# Patient Record
Sex: FEMALE | Race: WHITE | NOT HISPANIC OR LATINO | URBAN - METROPOLITAN AREA
[De-identification: names, ages, dates, MRNs, and addresses within clinical notes are randomized per-mention and may not be internally consistent; named-entity substitution may affect disease eponyms.]

---

## 2017-02-27 ENCOUNTER — INPATIENT (INPATIENT)
Facility: HOSPITAL | Age: 27
LOS: 4 days | Discharge: HOME | End: 2017-03-04
Attending: INTERNAL MEDICINE | Admitting: INTERNAL MEDICINE
Payer: COMMERCIAL

## 2017-02-27 VITALS
DIASTOLIC BLOOD PRESSURE: 84 MMHG | TEMPERATURE: 99 F | OXYGEN SATURATION: 100 % | SYSTOLIC BLOOD PRESSURE: 121 MMHG | HEART RATE: 87 BPM | RESPIRATION RATE: 16 BRPM

## 2017-02-27 DIAGNOSIS — G43.909 MIGRAINE, UNSPECIFIED, NOT INTRACTABLE, WITHOUT STATUS MIGRAINOSUS: ICD-10-CM

## 2017-02-27 LAB
ALBUMIN SERPL ELPH-MCNC: 3.5 G/DL — SIGNIFICANT CHANGE UP (ref 3.3–5)
ALP SERPL-CCNC: 157 U/L — HIGH (ref 40–120)
ALT FLD-CCNC: 20 U/L — SIGNIFICANT CHANGE UP (ref 4–33)
APPEARANCE UR: CLEAR — SIGNIFICANT CHANGE UP
AST SERPL-CCNC: 18 U/L — SIGNIFICANT CHANGE UP (ref 4–32)
BASOPHILS # BLD AUTO: 0.04 K/UL — SIGNIFICANT CHANGE UP (ref 0–0.2)
BASOPHILS NFR BLD AUTO: 0.3 % — SIGNIFICANT CHANGE UP (ref 0–2)
BILIRUB SERPL-MCNC: 0.4 MG/DL — SIGNIFICANT CHANGE UP (ref 0.2–1.2)
BILIRUB UR-MCNC: NEGATIVE — SIGNIFICANT CHANGE UP
BLOOD UR QL VISUAL: NEGATIVE — SIGNIFICANT CHANGE UP
BUN SERPL-MCNC: 5 MG/DL — LOW (ref 7–23)
CALCIUM SERPL-MCNC: 9.4 MG/DL — SIGNIFICANT CHANGE UP (ref 8.4–10.5)
CHLORIDE SERPL-SCNC: 101 MMOL/L — SIGNIFICANT CHANGE UP (ref 98–107)
CO2 SERPL-SCNC: 25 MMOL/L — SIGNIFICANT CHANGE UP (ref 22–31)
COLOR SPEC: YELLOW — SIGNIFICANT CHANGE UP
CREAT SERPL-MCNC: 0.78 MG/DL — SIGNIFICANT CHANGE UP (ref 0.5–1.3)
CRP SERPL-MCNC: 62.2 MG/L — HIGH (ref 0.3–5)
EOSINOPHIL # BLD AUTO: 1.21 K/UL — HIGH (ref 0–0.5)
EOSINOPHIL NFR BLD AUTO: 9.6 % — HIGH (ref 0–6)
ERYTHROCYTE [SEDIMENTATION RATE] IN BLOOD: 96 MM/HR — HIGH (ref 4–25)
GLUCOSE SERPL-MCNC: 111 MG/DL — HIGH (ref 70–99)
GLUCOSE UR-MCNC: NEGATIVE — SIGNIFICANT CHANGE UP
HCG UR-SCNC: NEGATIVE — SIGNIFICANT CHANGE UP
HCT VFR BLD CALC: 35.7 % — SIGNIFICANT CHANGE UP (ref 34.5–45)
HGB BLD-MCNC: 11.8 G/DL — SIGNIFICANT CHANGE UP (ref 11.5–15.5)
IMM GRANULOCYTES NFR BLD AUTO: 0.2 % — SIGNIFICANT CHANGE UP (ref 0–1.5)
KETONES UR-MCNC: NEGATIVE — SIGNIFICANT CHANGE UP
LEUKOCYTE ESTERASE UR-ACNC: NEGATIVE — SIGNIFICANT CHANGE UP
LYMPHOCYTES # BLD AUTO: 25.5 % — SIGNIFICANT CHANGE UP (ref 13–44)
LYMPHOCYTES # BLD AUTO: 3.21 K/UL — SIGNIFICANT CHANGE UP (ref 1–3.3)
MCHC RBC-ENTMCNC: 31.5 PG — SIGNIFICANT CHANGE UP (ref 27–34)
MCHC RBC-ENTMCNC: 33.1 % — SIGNIFICANT CHANGE UP (ref 32–36)
MCV RBC AUTO: 95.2 FL — SIGNIFICANT CHANGE UP (ref 80–100)
MONOCYTES # BLD AUTO: 0.86 K/UL — SIGNIFICANT CHANGE UP (ref 0–0.9)
MONOCYTES NFR BLD AUTO: 6.8 % — SIGNIFICANT CHANGE UP (ref 2–14)
MUCOUS THREADS # UR AUTO: SIGNIFICANT CHANGE UP
NEUTROPHILS # BLD AUTO: 7.27 K/UL — SIGNIFICANT CHANGE UP (ref 1.8–7.4)
NEUTROPHILS NFR BLD AUTO: 57.6 % — SIGNIFICANT CHANGE UP (ref 43–77)
NITRITE UR-MCNC: NEGATIVE — SIGNIFICANT CHANGE UP
NON-SQ EPI CELLS # UR AUTO: <1 — SIGNIFICANT CHANGE UP
PH UR: 6 — SIGNIFICANT CHANGE UP (ref 4.6–8)
PLATELET # BLD AUTO: 447 K/UL — HIGH (ref 150–400)
PMV BLD: 8.9 FL — SIGNIFICANT CHANGE UP (ref 7–13)
POTASSIUM SERPL-MCNC: 3.9 MMOL/L — SIGNIFICANT CHANGE UP (ref 3.5–5.3)
POTASSIUM SERPL-SCNC: 3.9 MMOL/L — SIGNIFICANT CHANGE UP (ref 3.5–5.3)
PROT SERPL-MCNC: 8.4 G/DL — HIGH (ref 6–8.3)
PROT UR-MCNC: 20 — SIGNIFICANT CHANGE UP
RBC # BLD: 3.75 M/UL — LOW (ref 3.8–5.2)
RBC # FLD: 13.1 % — SIGNIFICANT CHANGE UP (ref 10.3–14.5)
RBC CASTS # UR COMP ASSIST: SIGNIFICANT CHANGE UP (ref 0–?)
SODIUM SERPL-SCNC: 139 MMOL/L — SIGNIFICANT CHANGE UP (ref 135–145)
SP GR SPEC: 1.02 — SIGNIFICANT CHANGE UP (ref 1–1.03)
SP GR UR: 1.02 — SIGNIFICANT CHANGE UP (ref 1–1.03)
SQUAMOUS # UR AUTO: SIGNIFICANT CHANGE UP
UROBILINOGEN FLD QL: NORMAL E.U. — SIGNIFICANT CHANGE UP (ref 0.1–0.2)
WBC # BLD: 12.61 K/UL — HIGH (ref 3.8–10.5)
WBC # FLD AUTO: 12.61 K/UL — HIGH (ref 3.8–10.5)
WBC UR QL: SIGNIFICANT CHANGE UP (ref 0–?)

## 2017-02-27 PROCEDURE — 99223 1ST HOSP IP/OBS HIGH 75: CPT

## 2017-02-27 RX ORDER — HYDROCORTISONE 20 MG
100 TABLET ORAL ONCE
Qty: 0 | Refills: 0 | Status: COMPLETED | OUTPATIENT
Start: 2017-02-27 | End: 2017-02-27

## 2017-02-27 RX ORDER — SODIUM CHLORIDE 9 MG/ML
1000 INJECTION, SOLUTION INTRAVENOUS
Qty: 0 | Refills: 0 | Status: DISCONTINUED | OUTPATIENT
Start: 2017-02-27 | End: 2017-03-01

## 2017-02-27 RX ORDER — SODIUM CHLORIDE 9 MG/ML
1000 INJECTION INTRAMUSCULAR; INTRAVENOUS; SUBCUTANEOUS ONCE
Qty: 0 | Refills: 0 | Status: COMPLETED | OUTPATIENT
Start: 2017-02-27 | End: 2017-02-28

## 2017-02-27 RX ORDER — SODIUM CHLORIDE 9 MG/ML
1000 INJECTION INTRAMUSCULAR; INTRAVENOUS; SUBCUTANEOUS ONCE
Qty: 0 | Refills: 0 | Status: COMPLETED | OUTPATIENT
Start: 2017-02-27 | End: 2017-02-27

## 2017-02-27 RX ORDER — MULTIVIT-MIN/FERROUS GLUCONATE 9 MG/15 ML
1 LIQUID (ML) ORAL
Qty: 0 | Refills: 0 | COMMUNITY

## 2017-02-27 RX ORDER — NORGESTIMATE AND ETHINYL ESTRADIOL 7DAYSX3 LO
1 KIT ORAL
Qty: 0 | Refills: 0 | COMMUNITY

## 2017-02-27 RX ORDER — METOCLOPRAMIDE HCL 10 MG
10 TABLET ORAL ONCE
Qty: 0 | Refills: 0 | Status: COMPLETED | OUTPATIENT
Start: 2017-02-27 | End: 2017-02-27

## 2017-02-27 RX ORDER — HYDROCORTISONE 20 MG
100 TABLET ORAL ONCE
Qty: 0 | Refills: 0 | Status: DISCONTINUED | OUTPATIENT
Start: 2017-02-27 | End: 2017-02-27

## 2017-02-27 RX ORDER — L.ACIDOPH/B.ANIMALIS/B.LONGUM 15B CELL
1 CAPSULE ORAL
Qty: 0 | Refills: 0 | COMMUNITY

## 2017-02-27 RX ORDER — ALBUTEROL 90 UG/1
2 AEROSOL, METERED ORAL
Qty: 0 | Refills: 0 | COMMUNITY

## 2017-02-27 RX ADMIN — Medication 10 MILLIGRAM(S): at 22:18

## 2017-02-27 RX ADMIN — SODIUM CHLORIDE 1000 MILLILITER(S): 9 INJECTION INTRAMUSCULAR; INTRAVENOUS; SUBCUTANEOUS at 22:18

## 2017-02-27 RX ADMIN — Medication 100 MILLIGRAM(S): at 22:18

## 2017-02-27 NOTE — H&P ADULT. - ASSESSMENT
26 year old female, with PH significant for Asthma, Ulcerative colitis, Abscess, Migraine headache presented to the ED secondary to headache and ulcerative colitis flare; seen and evaluated at bedside with mother present.  Diagnosed with Irritable Bowel Disease and Migraine.  Admitted for further management of: 26 year old female, with PH significant for Asthma, Ulcerative colitis, Abscess, Migraine headache presented to the ED secondary to headache and ulcerative colitis flare; seen and evaluated at bedside with mother present.  Diagnosed with Irritable Bowel Disease and Migraine.  Admitted for further management of Ulcerative colitis, Non-intractable migraine, Uncomplicated asthma, Abscess...

## 2017-02-27 NOTE — H&P ADULT. - PROBLEM SELECTOR PLAN 5
- none for now  - ambulate as tolerated  - out of bed to chair BID - vaginal - since tapering Humira  - s/p evaluation by GYN - I&D and antibiotics  - continues on Augmentin for one more day

## 2017-02-27 NOTE — H&P ADULT. - FAMILY HISTORY
Father  Still living? Unknown  Family history of diabetes mellitus, Age at diagnosis: Age Unknown     Grandparent  Still living? Unknown  Family history of lung cancer, Age at diagnosis: Age Unknown

## 2017-02-27 NOTE — H&P ADULT. - HISTORY OF PRESENT ILLNESS
26 year old female, with PH significant for Asthma, Ulcerative colitis, Abscess, Migraine headache presented to the ED secondary to headache and ulcerative colitis flare; seen and evaluated at bedside;      Vital signs upon ED presentation as follows: BP = 121/84, HR = 87, RR = 16, T = 37.1 C (98.8 F), O2 Sat = 100% on RA.  Diagnosed with Irritable Bowel Disease and Migraine.  Prescribed solu-cortef 100 mg, Metoclopramide 10 mg and Sodium chloride 1 liter in the ED. 26 year old female, with PH significant for Asthma, Ulcerative colitis, Abscess, Migraine headache presented to the ED secondary to headache and ulcerative colitis flare; seen and evaluated at bedside with mother present.  Patient relates that she has been taking Humira for some time and decided to taper same on her own.  However, since this attempt, patient has been suffering with "weird" signs/symptoms.  Patient states that she has had bumps on her legs, ulcers on her eyelid and abscess on vaginal area that required GYN visit with subsequent I&D and antibiotic therapy.  She also has associated intermittent migraine headaches - the current one being of the greatest intensity thus far; rated at >10/10 at maximum intensity.  Current migraine headache started on Friday and has been persistent.  She comments that after exiting the shower on the day of ED presentation, she noted that her left eye was significantly swollen.  Patient contacted her gastroenterologist, Tarik Graham MD, philly advised that patient should come to the ED.  Attempted to ameliorate the headache with OTC medications, including Advil, but without success.  Relative to the UC, patient is not aware of any rectal ulcers, no rectal pain, no significant abdominal pain.  Does, however, endorse diarrhea consisting of brownish stools.  Had one episode of vomiting in the ED, which patient attributes to S/E of the medication she received in the ED.  No reports of sick contacts.  No blurred vision, rhinorrhea, sinus issues, fevers, chills, chest pain or SOB.  Comments on pain sensation below B/L lower ribs areas - exacerbated with deep inspiration.    Vital signs upon ED presentation as follows: BP = 121/84, HR = 87, RR = 16, T = 37.1 C (98.8 F), O2 Sat = 100% on RA.  Diagnosed with Irritable Bowel Disease and Migraine.  Prescribed solu-cortef 100 mg, Metoclopramide 10 mg and Sodium chloride 1 liter in the ED.

## 2017-02-27 NOTE — H&P ADULT. - PROBLEM SELECTOR PLAN 3
- no acute issues presently  - continues on proventil HFA - below bilateral lower ribs & pronounced during deep inspiration  - pt w/ h/o asthma, but no wheezing, coughing, SOB, fever  - f/u CT A/P for lower lung fields  - no need for analgesic presently  - continued evaluation for any sign of improvement/worsening

## 2017-02-27 NOTE — H&P ADULT. - PROBLEM SELECTOR PROBLEM 2
Ulcerative colitis with other complication, unspecified location Nonintractable migraine, unspecified migraine type

## 2017-02-27 NOTE — H&P ADULT. - PROBLEM SELECTOR PLAN 1
- patient has been self tapering medication, but suffers with unusual signs/symptoms - bumps on skin, ulcers on eye and vagina, - patient has been self tapering Humira (has not taken dose in 4 weeks), but now suffers with unusual signs/symptoms - bumps on skin, ulcers on oral mucosa, eye and vagina, migraine headaches  - no report of abdominal pain or ulcers at rectal/anal area,   - increased frequency of stools ~ 6 daily  - ESR elevated  - s/p solu-cortef 100 mg in the ED; continued at 100 mg Q8H  - IVF hydration  - GI consult in the AM - Tarik Graham MD (please call)  - f/u CT abd/pelvis (patient already had oral contrast) - patient has been self tapering Humira (has not taken dose in 4 weeks), but now suffers with unusual signs/symptoms - bumps on skin, ulcers on oral mucosa, eye and vagina, migraine headaches  - no report of abdominal pain or ulcers at rectal/anal area,   - increased frequency of stools ~ 6 daily  - ESR elevated = 96  - s/p solu-cortef 100 mg in the ED; continued at 100 mg Q8H  - IVF hydration  - GI consult in the AM - Tarik Graham MD (please call)  - f/u CT abd/pelvis (patient already had oral contrast)

## 2017-02-27 NOTE — H&P ADULT. - PROBLEM SELECTOR PLAN 2
- patient associates same with ulcerative colitis tapering  - located left supraorbital and with swelling of eyelids (improved since ED)  - no report of sinus symptoms, vision difficulties  - on solu-cortef, which most likely has helped to ameliorate pain sensation  - IVF hydration  - analgesic if pain worsens

## 2017-02-27 NOTE — ED PROVIDER NOTE - SKIN, MLM
Skin normal color for race, warm, dry and intact. No evidence of rash. scatterd ~2inch erythematomous nodules on b/l legs; apthous ulcer

## 2017-02-27 NOTE — H&P ADULT. - MUSCULOSKELETAL
details… detailed exam no calf tenderness/no joint erythema/no joint warmth/ROM intact/no joint swelling

## 2017-02-27 NOTE — ED ADULT TRIAGE NOTE - CHIEF COMPLAINT QUOTE
Pt. c/o severe migraine since  Friday that has gotten worst 10/10 pain and swelling, redness to L eye.  Pt. has been weening herself off of humara for 1 month.

## 2017-02-27 NOTE — H&P ADULT. - PROBLEM SELECTOR PLAN 4
- vaginal - since tapering Humira  - s/p evaluation by GYN - I&D and antibiotics  - continues on Augmentin for one more day - no acute issues presently  - continues on proventil HFA

## 2017-02-27 NOTE — H&P ADULT. - PROBLEM SELECTOR PLAN 6
- clear liquid diet for now (pending GI's recommendation/s)  - IVF hydration - none for now  - ambulate as tolerated  - out of bed to chair BID

## 2017-02-27 NOTE — H&P ADULT. - PROBLEM SELECTOR PROBLEM 3
Nonintractable migraine, unspecified migraine type Uncomplicated asthma, unspecified asthma severity Rib pain

## 2017-02-27 NOTE — ED PROVIDER NOTE - OBJECTIVE STATEMENT
26F with UC p/w migraine since friday and UC flare; no fever/ 6 episodes - sometimes bloody/ sometimes brown increasing in frequency over last few days;  migraine is pressure on left eye, no trauma; similar to previous headaches but not more severe; pressure in eye area is new;  no hematuria/dysuria; currently menstruating. was on antibiotics for abscess - removed last monday, no on amoxicillin; no cp/sob/    met a few yeasr ago , father has UC, had bloody diarrhea, wnet to ER - dx with E nordosum; on humara intermittent follow up, mom called bc stopped humara a few months ago, saw eye doctor/derm; now has diarrhea, took sick to be see in office tomm; ARCADIO vinson;     dr. sheree portillo - admit ready - [phone] 26F with UC p/w migraine since friday and UC flare; no fever/ 6 episodes - sometimes bloody/ sometimes brown increasing in frequency over last few days;  migraine is pressure on left eye, no trauma; similar to previous headaches but not more severe; pressure in eye area is new;  no hematuria/dysuria; currently menstruating. was on antibiotics for abscess - removed last monday, no on amoxicillin; no cp/sob/  met a few yeasr ago , father has UC, had bloody diarrhea, wnet to ER - dx with E nordosum; on humara intermittent follow up, mom called bc stopped humara a few months ago, saw eye doctor/derm; now has diarrhea, took sick to be see in office tomm; ARCADIO vinson;     dr. sheree portillo - admit ready - [phone]

## 2017-02-27 NOTE — ED PROVIDER NOTE - ATTENDING CONTRIBUTION TO CARE
DR. MATT, ATTENDING MD-  I performed a face to face bedside interview with patient regarding history of present illness, review of symptoms and past medical history. I completed an independent physical exam.  I have discussed patient's plan of care with the resident.   Documentation as above in the note.    25 y/o female with h/o ulc colitis here with mult med complaints.  Headache worse behind left eye, intra-oral ulcer, rash over legs, crampy diffuse abd pain, diarrhea x6 not bloody today.  Denies f/c, neck stiffness, cp, sob, cough, dysuria.  Afebrile, vs wnl, appears uncomfortable, left eyelid mildly swollen, left eye mildly injected conjunctiva, eomi, perrla, no proptosis, ctabil, s1s2 rrr no m/r/g, abd soft non ttp no r/g, no cva tenderness b/l, no leg swelling b/l, erythema nodosum b/l LE's, single aphthous ulcer at left buccal mucosa.  Likely ulc colitis flare with concomitant migraine, no signs of orbital cellulitis or bowel obstruction, vss.  GI doctor requests for pt to receive CT abd/pelv to better diff IBS.  Obtain cbc, bmp, give ivf, steroid, antiemetic, admit.

## 2017-02-27 NOTE — ED PROVIDER NOTE - MEDICAL DECISION MAKING DETAILS
diarrhea, apthous ulcer, e nordosum like rash, preseptal edema, findings c/s ibd flare (us vs. chrons), + migraine like headache and mild r eye presetal edema, normal visual acuity, no overlying warmth, erythema, pain with extra-occular motion; will give steroids, will get ct scan as pt has not had one in past and potential to r/o complications and perhaps better characterize as uc vs chrons, admit, re-assess

## 2017-02-27 NOTE — H&P ADULT. - SKIN COMMENTS
tattoo on right lateral foot tattoo on right lateral foot, erythematous lump under skin at left posterior lower leg, right shin and left upper lateral thigh

## 2017-02-27 NOTE — ED PROVIDER NOTE - PHYSICAL EXAMINATION
mild preseptal edema, no erythema, warmth, no pain with extra-ocular movements, 20/20 visual acuity b/l;

## 2017-02-28 DIAGNOSIS — J45.909 UNSPECIFIED ASTHMA, UNCOMPLICATED: ICD-10-CM

## 2017-02-28 DIAGNOSIS — R63.8 OTHER SYMPTOMS AND SIGNS CONCERNING FOOD AND FLUID INTAKE: ICD-10-CM

## 2017-02-28 DIAGNOSIS — L02.91 CUTANEOUS ABSCESS, UNSPECIFIED: ICD-10-CM

## 2017-02-28 DIAGNOSIS — G43.009 MIGRAINE WITHOUT AURA, NOT INTRACTABLE, WITHOUT STATUS MIGRAINOSUS: ICD-10-CM

## 2017-02-28 DIAGNOSIS — Z41.8 ENCOUNTER FOR OTHER PROCEDURES FOR PURPOSES OTHER THAN REMEDYING HEALTH STATE: ICD-10-CM

## 2017-02-28 DIAGNOSIS — K51.918 ULCERATIVE COLITIS, UNSPECIFIED WITH OTHER COMPLICATION: ICD-10-CM

## 2017-02-28 DIAGNOSIS — K51.90 ULCERATIVE COLITIS, UNSPECIFIED, WITHOUT COMPLICATIONS: ICD-10-CM

## 2017-02-28 DIAGNOSIS — R07.81 PLEURODYNIA: ICD-10-CM

## 2017-02-28 PROCEDURE — 74177 CT ABD & PELVIS W/CONTRAST: CPT | Mod: 26

## 2017-02-28 PROCEDURE — 99254 IP/OBS CNSLTJ NEW/EST MOD 60: CPT

## 2017-02-28 RX ORDER — ALBUTEROL 90 UG/1
2 AEROSOL, METERED ORAL EVERY 6 HOURS
Qty: 0 | Refills: 0 | Status: DISCONTINUED | OUTPATIENT
Start: 2017-02-28 | End: 2017-03-04

## 2017-02-28 RX ORDER — HYDROCORTISONE 20 MG
100 TABLET ORAL EVERY 8 HOURS
Qty: 0 | Refills: 0 | Status: DISCONTINUED | OUTPATIENT
Start: 2017-02-28 | End: 2017-03-04

## 2017-02-28 RX ORDER — ONDANSETRON 8 MG/1
4 TABLET, FILM COATED ORAL EVERY 8 HOURS
Qty: 0 | Refills: 0 | Status: DISCONTINUED | OUTPATIENT
Start: 2017-02-28 | End: 2017-03-04

## 2017-02-28 RX ORDER — ACETAMINOPHEN 500 MG
650 TABLET ORAL EVERY 6 HOURS
Qty: 0 | Refills: 0 | Status: DISCONTINUED | OUTPATIENT
Start: 2017-02-28 | End: 2017-03-04

## 2017-02-28 RX ADMIN — Medication 100 MILLIGRAM(S): at 22:26

## 2017-02-28 RX ADMIN — SODIUM CHLORIDE 100 MILLILITER(S): 9 INJECTION, SOLUTION INTRAVENOUS at 01:37

## 2017-02-28 RX ADMIN — Medication 100 MILLIGRAM(S): at 05:45

## 2017-02-28 RX ADMIN — SODIUM CHLORIDE 1000 MILLILITER(S): 9 INJECTION INTRAMUSCULAR; INTRAVENOUS; SUBCUTANEOUS at 00:23

## 2017-02-28 RX ADMIN — Medication 1 TABLET(S): at 05:44

## 2017-02-28 RX ADMIN — Medication 1 TABLET(S): at 17:48

## 2017-02-28 RX ADMIN — Medication 100 MILLIGRAM(S): at 14:09

## 2017-03-01 LAB
BUN SERPL-MCNC: 2 MG/DL — LOW (ref 7–23)
CALCIUM SERPL-MCNC: 8.1 MG/DL — LOW (ref 8.4–10.5)
CHLORIDE SERPL-SCNC: 107 MMOL/L — SIGNIFICANT CHANGE UP (ref 98–107)
CO2 SERPL-SCNC: 21 MMOL/L — LOW (ref 22–31)
CREAT SERPL-MCNC: 0.64 MG/DL — SIGNIFICANT CHANGE UP (ref 0.5–1.3)
GLUCOSE SERPL-MCNC: 143 MG/DL — HIGH (ref 70–99)
HCT VFR BLD CALC: 33.1 % — LOW (ref 34.5–45)
HGB BLD-MCNC: 11 G/DL — LOW (ref 11.5–15.5)
MCHC RBC-ENTMCNC: 31.3 PG — SIGNIFICANT CHANGE UP (ref 27–34)
MCHC RBC-ENTMCNC: 33.2 % — SIGNIFICANT CHANGE UP (ref 32–36)
MCV RBC AUTO: 94.3 FL — SIGNIFICANT CHANGE UP (ref 80–100)
PLATELET # BLD AUTO: 414 K/UL — HIGH (ref 150–400)
PMV BLD: 9.2 FL — SIGNIFICANT CHANGE UP (ref 7–13)
POTASSIUM SERPL-MCNC: 3.4 MMOL/L — LOW (ref 3.5–5.3)
POTASSIUM SERPL-SCNC: 3.4 MMOL/L — LOW (ref 3.5–5.3)
RBC # BLD: 3.51 M/UL — LOW (ref 3.8–5.2)
RBC # FLD: 13.3 % — SIGNIFICANT CHANGE UP (ref 10.3–14.5)
SODIUM SERPL-SCNC: 143 MMOL/L — SIGNIFICANT CHANGE UP (ref 135–145)
WBC # BLD: 9.27 K/UL — SIGNIFICANT CHANGE UP (ref 3.8–10.5)
WBC # FLD AUTO: 9.27 K/UL — SIGNIFICANT CHANGE UP (ref 3.8–10.5)

## 2017-03-01 PROCEDURE — 99232 SBSQ HOSP IP/OBS MODERATE 35: CPT

## 2017-03-01 RX ORDER — POTASSIUM CHLORIDE 20 MEQ
40 PACKET (EA) ORAL ONCE
Qty: 0 | Refills: 0 | Status: COMPLETED | OUTPATIENT
Start: 2017-03-01 | End: 2017-03-01

## 2017-03-01 RX ADMIN — Medication 100 MILLIGRAM(S): at 21:32

## 2017-03-01 RX ADMIN — Medication 1 TABLET(S): at 17:32

## 2017-03-01 RX ADMIN — Medication 100 MILLIGRAM(S): at 13:00

## 2017-03-01 RX ADMIN — Medication 100 MILLIGRAM(S): at 06:39

## 2017-03-02 LAB
ALBUMIN SERPL ELPH-MCNC: 2.9 G/DL — LOW (ref 3.3–5)
ALP SERPL-CCNC: 116 U/L — SIGNIFICANT CHANGE UP (ref 40–120)
ALT FLD-CCNC: 15 U/L — SIGNIFICANT CHANGE UP (ref 4–33)
AST SERPL-CCNC: 14 U/L — SIGNIFICANT CHANGE UP (ref 4–32)
BILIRUB SERPL-MCNC: 0.5 MG/DL — SIGNIFICANT CHANGE UP (ref 0.2–1.2)
BUN SERPL-MCNC: 5 MG/DL — LOW (ref 7–23)
CALCIUM SERPL-MCNC: 8.8 MG/DL — SIGNIFICANT CHANGE UP (ref 8.4–10.5)
CHLORIDE SERPL-SCNC: 101 MMOL/L — SIGNIFICANT CHANGE UP (ref 98–107)
CO2 SERPL-SCNC: 24 MMOL/L — SIGNIFICANT CHANGE UP (ref 22–31)
CREAT SERPL-MCNC: 0.59 MG/DL — SIGNIFICANT CHANGE UP (ref 0.5–1.3)
GLUCOSE SERPL-MCNC: 109 MG/DL — HIGH (ref 70–99)
HCT VFR BLD CALC: 36.8 % — SIGNIFICANT CHANGE UP (ref 34.5–45)
HGB BLD-MCNC: 12 G/DL — SIGNIFICANT CHANGE UP (ref 11.5–15.5)
MAGNESIUM SERPL-MCNC: 1.9 MG/DL — SIGNIFICANT CHANGE UP (ref 1.6–2.6)
MCHC RBC-ENTMCNC: 31.1 PG — SIGNIFICANT CHANGE UP (ref 27–34)
MCHC RBC-ENTMCNC: 32.6 % — SIGNIFICANT CHANGE UP (ref 32–36)
MCV RBC AUTO: 95.3 FL — SIGNIFICANT CHANGE UP (ref 80–100)
PLATELET # BLD AUTO: 474 K/UL — HIGH (ref 150–400)
PMV BLD: 9.5 FL — SIGNIFICANT CHANGE UP (ref 7–13)
POTASSIUM SERPL-MCNC: 3.7 MMOL/L — SIGNIFICANT CHANGE UP (ref 3.5–5.3)
POTASSIUM SERPL-SCNC: 3.7 MMOL/L — SIGNIFICANT CHANGE UP (ref 3.5–5.3)
PROT SERPL-MCNC: 7.1 G/DL — SIGNIFICANT CHANGE UP (ref 6–8.3)
RBC # BLD: 3.86 M/UL — SIGNIFICANT CHANGE UP (ref 3.8–5.2)
RBC # FLD: 13.5 % — SIGNIFICANT CHANGE UP (ref 10.3–14.5)
SODIUM SERPL-SCNC: 141 MMOL/L — SIGNIFICANT CHANGE UP (ref 135–145)
WBC # BLD: 10.6 K/UL — HIGH (ref 3.8–10.5)
WBC # FLD AUTO: 10.6 K/UL — HIGH (ref 3.8–10.5)

## 2017-03-02 RX ORDER — POTASSIUM CHLORIDE 20 MEQ
40 PACKET (EA) ORAL ONCE
Qty: 0 | Refills: 0 | Status: COMPLETED | OUTPATIENT
Start: 2017-03-02 | End: 2017-03-02

## 2017-03-02 RX ADMIN — Medication 100 MILLIGRAM(S): at 23:02

## 2017-03-02 RX ADMIN — Medication 100 MILLIGRAM(S): at 05:39

## 2017-03-02 RX ADMIN — Medication 1 TABLET(S): at 11:45

## 2017-03-02 RX ADMIN — Medication 100 MILLIGRAM(S): at 15:12

## 2017-03-02 NOTE — PROVIDER CONTACT NOTE (MEDICATION) - ACTION/TREATMENT ORDERED:
required dose not available in tablet form, potassium chloride solution to be ordered. Pt is aware and willing to try

## 2017-03-02 NOTE — PROVIDER CONTACT NOTE (MEDICATION) - ASSESSMENT
Lab results show recent serum K+ level of 3.4, was 3.9 on 2/27/17. Pt denies chest pain-no evidence of acute distress, VSS.

## 2017-03-02 NOTE — PROVIDER CONTACT NOTE (MEDICATION) - SITUATION
As per report from day RN, Anastasia patient refused potassium chloride powder and is requesting tablets.

## 2017-03-03 LAB — C DIFF TOX GENS STL QL NAA+PROBE: SIGNIFICANT CHANGE UP

## 2017-03-03 RX ADMIN — Medication 100 MILLIGRAM(S): at 13:53

## 2017-03-03 RX ADMIN — Medication 1 TABLET(S): at 12:15

## 2017-03-03 RX ADMIN — Medication 100 MILLIGRAM(S): at 06:32

## 2017-03-03 RX ADMIN — Medication 100 MILLIGRAM(S): at 21:46

## 2017-03-04 VITALS
HEART RATE: 80 BPM | OXYGEN SATURATION: 96 % | SYSTOLIC BLOOD PRESSURE: 113 MMHG | TEMPERATURE: 97 F | DIASTOLIC BLOOD PRESSURE: 76 MMHG | RESPIRATION RATE: 18 BRPM

## 2017-03-04 RX ORDER — ACETAMINOPHEN 500 MG
2 TABLET ORAL
Qty: 0 | Refills: 0 | COMMUNITY
Start: 2017-03-04

## 2017-03-04 RX ORDER — ADALIMUMAB 40MG/0.8ML
0 KIT SUBCUTANEOUS
Qty: 0 | Refills: 0 | COMMUNITY

## 2017-03-04 RX ADMIN — Medication 100 MILLIGRAM(S): at 13:14

## 2017-03-04 RX ADMIN — Medication 1 TABLET(S): at 13:15

## 2017-03-04 RX ADMIN — Medication 100 MILLIGRAM(S): at 05:46

## 2017-03-04 NOTE — DISCHARGE NOTE ADULT - MEDICATION SUMMARY - MEDICATIONS TO TAKE
I will START or STAY ON the medications listed below when I get home from the hospital:    predniSONE 10 mg oral tablet  -- 4 tab(s) by mouth once a day  -- It is very important that you take or use this exactly as directed.  Do not skip doses or discontinue unless directed by your doctor.  Obtain medical advice before taking any non-prescription drugs as some may affect the action of this medication.  Take with food or milk.    -- Indication: For Ulcerative colitis    acetaminophen 325 mg oral tablet  -- 2 tab(s) by mouth every 6 hours, As needed, Mild Pain (1 - 3)  -- Indication: For pain    ProAir HFA 90 mcg/inh inhalation aerosol  -- 2 puff(s) inhaled 4 times a day, As Needed  -- Indication: For sob/wheezing    VSL#3 oral capsule  -- 1 cap(s) by mouth once a day  -- Indication: For prophylaxis    Tri-Lo-Sprintec oral tablet  -- 1 tab(s) by mouth once a day  -- Indication: For birth control    One-A-Day Women oral tablet  -- 1 tab(s) by mouth once a day  -- Indication: For supplement

## 2017-03-04 NOTE — DISCHARGE NOTE ADULT - PLAN OF CARE
- vaginal - since tapering Humira  - s/p evaluation by GYN - I&D and antibiotics  - completed course of antibiotics   - ID consulted recs to monitor off Abx.    Pt to follow up with private GYN as an outpt resolved remain free of pain Tylenol PRN remain free of flares Please follow up with your pcp and Dr. Graham Gastroenterologist within 1 week of discharge.  Please call to make an apointment.. Continue with prednisone as prescribed

## 2017-03-04 NOTE — DISCHARGE NOTE ADULT - HOSPITAL COURSE
26 year old female, with PH significant for Asthma, Ulcerative colitis, Abscess, Migraine headache presented to the ED secondary to headache and ulcerative colitis flare; seen and evaluated at bedside with mother present.  Diagnosed with Irritable Bowel Disease and Migraine.  Admitted for further management of Ulcerative colitis, Non-intractable migraine, Uncomplicated asthma, Abscess...    ULCERATIVE COLITIS  - patient has been self tapering Humira (has not taken dose in 4 weeks)  - ESR elevated = 96  - s/p solu-cortef 100 mg in the ED; continued at 100 mg Q8H  - IVF hydration  - GI Dr Graham following   - CT abd/pelvis: Mild wall thickening of the sigmoid colon and rectum compatible with colitis.  -Discharge on Prednison 40mg daily : Pt to follow up with Dr. Graham within 1 week of discharge.      Leukocytosis  - ID consulted recs to monitor off Abx.      MIGRAINE   - located left supraorbital and with swelling of eyelids (improved since ED)  - on solu-cortef, which most likely has helped to ameliorate pain sensation  - IVF hydration, tylenol PRN     RIB PAIN   - below bilateral lower ribs & pronounced during deep inspiration  - pt w/ h/o asthma, but no wheezing, coughing, SOB, fever  - continued evaluation for any sign of improvement/worsening.  - Tylenol PRN    ASTHMA   - no acute issues presently  - continues on proventil HFA.     ABSCESS  - vaginal - since tapering Humira  - s/p evaluation by GYN - I&D and antibiotics  - continues on Augmentin for one more day.   - ID consulted recs to monitor off Abx.      DVT PPX  - none for now  - ambulate as tolerated  - out of bed to chair BID.    Dispo: Home

## 2017-03-04 NOTE — DISCHARGE NOTE ADULT - PATIENT PORTAL LINK FT
“You can access the Follow[facility] Patient Portal, offered by [facility], by registering with the following website: [email or web address]”

## 2017-03-04 NOTE — DISCHARGE NOTE ADULT - CARE PLAN
Principal Discharge DX:	Ulcerative colitis  Goal:	remain free of flares  Instructions for follow-up, activity and diet:	Please follow up with your pcp and Dr. Graham Gastroenterologist within 1 week of discharge.  Please call to make an apointment.. Continue with prednisone as prescribed  Secondary Diagnosis:	Migraine  Goal:	remain free of pain  Instructions for follow-up, activity and diet:	Tylenol PRN  Secondary Diagnosis:	Leukocytosis  Goal:	resolved  Secondary Diagnosis:	Abscess  Goal:	resolved  Instructions for follow-up, activity and diet:	- vaginal - since tapering Humira  - s/p evaluation by GYN - I&D and antibiotics  - completed course of antibiotics   - ID consulted recs to monitor off Abx.    Pt to follow up with private GYN as an outpt

## 2017-03-04 NOTE — DISCHARGE NOTE ADULT - MEDICATION SUMMARY - MEDICATIONS TO STOP TAKING
I will STOP taking the medications listed below when I get home from the hospital:    Augmentin 875 mg-125 mg oral tablet  -- 1 tab(s) by mouth every 12 hours    Humira 40 mg/0.8 mL subcutaneous kit  --  subcutaneous every 2 weeks

## 2017-03-04 NOTE — DISCHARGE NOTE ADULT - CARE PROVIDER_API CALL
Tarik Graham (DO), Gastroenterology; Internal Medicine  [address] [place] [address]  Phone: [phone]  Fax: [phone]

## 2018-05-14 NOTE — PATIENT PROFILE ADULT. - PRO MENTAL HEALTH SX RECENT
ED notes reviewed    Dr Ríos is manageing UTI/hematuria    Please inquire to Fausto becerriloojon concerns       none

## 2018-09-21 NOTE — PATIENT PROFILE ADULT. - FUNCTIONAL SCREEN CURRENT LEVEL: COMMUNICATION, MLM
Detail Level: Simple
Hide Additional Notes?: No
Additional Notes (Optional): What are he is showing me are normal glands. Should any of these grow out of proportion to the others he will let us know
(0) understands/communicates without difficulty

## 2018-12-12 NOTE — H&P ADULT. - [facility]
Pt states that she thinks that she has bacterial vaginosis. Pt wanted DONNA to call in a rx. Informed pt that she would need to come in for cultures. Pt states that she has thick white discharge and a little vaginal itching. Denies an odor.   Asked pt i regular rate and rhythm

## 2019-01-16 ENCOUNTER — TRANSCRIPTION ENCOUNTER (OUTPATIENT)
Age: 29
End: 2019-01-16

## 2019-08-23 NOTE — PATIENT PROFILE ADULT. - AS SC BRADEN MOISTURE
Appearance: Pt awake, alert & oriented to person, place & time. Pt in no acute distress at present time. Pt is clean and well groomed with clothes appropriately fastened.   Skin: Skin warm, dry & intact. Color consistent with ethnicity. Mucous membranes moist. No breakdown or brusing noted.   Musculoskeletal: Patient moving all extremities well, no obvious swelling or deformities noted.   Respiratory: Respirations spontaneous, even, and non-labored. Visible chest rise noted. Airway is open and patent. No accessory muscle use noted.   Neurologic: Sensation is intact. Speech is clear and appropriate. Eyes open spontaneously, behavior appropriate to situation, follows commands, facial expression symmetrical, bilateral hand grasp equal and even, purposeful motor response noted.  Cardiac: All peripheral pulses present. No Bilateral lower extremity edema. Cap refill is <3 seconds. Pt denies active chest pains, SOB, dizziness, blurred vision, weakness or fatigue at this time. Regular rate and rhythm noted at this time.   Abdomen:  Pt denies active abd pains, cramping or discomfort, No N/V/D at this time.   : Pt reports no dysuria or hematuria.         (4) rarely moist

## 2020-11-09 ENCOUNTER — TRANSCRIPTION ENCOUNTER (OUTPATIENT)
Age: 30
End: 2020-11-09

## 2020-11-30 ENCOUNTER — TRANSCRIPTION ENCOUNTER (OUTPATIENT)
Age: 30
End: 2020-11-30

## 2022-01-05 ENCOUNTER — TRANSCRIPTION ENCOUNTER (OUTPATIENT)
Age: 32
End: 2022-01-05

## 2022-01-05 PROBLEM — K51.90 ULCERATIVE COLITIS, UNSPECIFIED, WITHOUT COMPLICATIONS: Chronic | Status: ACTIVE | Noted: 2017-02-28

## 2022-01-05 PROBLEM — G43.909 MIGRAINE, UNSPECIFIED, NOT INTRACTABLE, WITHOUT STATUS MIGRAINOSUS: Chronic | Status: ACTIVE | Noted: 2017-02-28

## 2022-01-06 ENCOUNTER — OUTPATIENT (OUTPATIENT)
Dept: INPATIENT UNIT | Facility: HOSPITAL | Age: 32
LOS: 1 days | End: 2022-01-06
Payer: MEDICAID

## 2022-01-06 ENCOUNTER — APPOINTMENT (OUTPATIENT)
Dept: DISASTER EMERGENCY | Facility: HOSPITAL | Age: 32
End: 2022-01-06

## 2022-01-06 VITALS
HEIGHT: 60 IN | DIASTOLIC BLOOD PRESSURE: 85 MMHG | TEMPERATURE: 98 F | OXYGEN SATURATION: 97 % | SYSTOLIC BLOOD PRESSURE: 117 MMHG | HEART RATE: 94 BPM | WEIGHT: 139.99 LBS | RESPIRATION RATE: 18 BRPM

## 2022-01-06 VITALS
RESPIRATION RATE: 18 BRPM | SYSTOLIC BLOOD PRESSURE: 112 MMHG | HEART RATE: 90 BPM | DIASTOLIC BLOOD PRESSURE: 78 MMHG | OXYGEN SATURATION: 98 % | TEMPERATURE: 98 F

## 2022-01-06 DIAGNOSIS — U07.1 COVID-19: ICD-10-CM

## 2022-01-06 PROCEDURE — M0247: CPT

## 2022-01-06 RX ORDER — SOTROVIMAB 62.5 MG/ML
500 INJECTION, SOLUTION, CONCENTRATE INTRAVENOUS ONCE
Refills: 0 | Status: COMPLETED | OUTPATIENT
Start: 2022-01-06 | End: 2022-01-06

## 2022-01-06 RX ORDER — SODIUM CHLORIDE 9 MG/ML
250 INJECTION INTRAMUSCULAR; INTRAVENOUS; SUBCUTANEOUS
Refills: 0 | Status: DISCONTINUED | OUTPATIENT
Start: 2022-01-06 | End: 2022-01-20

## 2022-01-06 RX ADMIN — SODIUM CHLORIDE 25 MILLILITER(S): 9 INJECTION INTRAMUSCULAR; INTRAVENOUS; SUBCUTANEOUS at 08:36

## 2022-01-06 RX ADMIN — SOTROVIMAB 116 MILLIGRAM(S): 62.5 INJECTION, SOLUTION, CONCENTRATE INTRAVENOUS at 08:36

## 2022-01-06 NOTE — CHART NOTE - NSCHARTNOTEFT_GEN_A_CORE
CC: Monoclonal Antibody Infusion - COVID-19 Positive or significant COVID-19 exposure       History: 31 year old female with Asthma and Crohn's disease on Remicade, who is vaccinated but not boosted since she was not eligible yet  , developed symptoms.  Patient presents for infusion of Sotrovimab monoclonal antibody infusion. Patient has been screened and was deemed to be a candidate.    Date tested COVID-19 positive: 1/5/22      COVID Symptoms:  Date of onset:   * Fever-  * Cough-    Risk Profile includes:   *Crohn's   *Asthma       Vaccination Status:   Date received 2nd dose Pfizer : 9/21   Booster Vaccine: none       No Known Allergies     Crohn's on Remicade COVID-19 positive and symptomatic who was referred to the infusion center for Monoclonal antibody infusion (Sotrovimab).      PLAN:  - infusion procedure explained to patient   - Consent for monoclonal antibody infusion obtained   - Risk & benefits discussed/all questions answered  - infusion of Sotrovimab  - will observe patient for one hour post infusion  and then if stable discharge home with outpatient follow up as planned by PMD.    POST INFUSION ASSESSMENT:   DISCHARGE at approximately  XXX  hours    - Patient tolerated infusion well denies complaints of chest pain, SOB, dizziness or palpitations  - VSS for discharge home  - D/C instructions given/ fact sheet included.  - Patient to follow-up with PCP as needed.

## 2022-05-30 ENCOUNTER — NON-APPOINTMENT (OUTPATIENT)
Age: 32
End: 2022-05-30

## 2022-07-02 ENCOUNTER — NON-APPOINTMENT (OUTPATIENT)
Age: 32
End: 2022-07-02

## 2024-07-16 ENCOUNTER — TRANSCRIPTION ENCOUNTER (OUTPATIENT)
Age: 34
End: 2024-07-16

## 2024-07-16 ENCOUNTER — APPOINTMENT (OUTPATIENT)
Dept: OBGYN | Facility: CLINIC | Age: 34
End: 2024-07-16
Payer: COMMERCIAL

## 2024-07-16 VITALS
WEIGHT: 160 LBS | HEIGHT: 60 IN | BODY MASS INDEX: 31.41 KG/M2 | DIASTOLIC BLOOD PRESSURE: 70 MMHG | SYSTOLIC BLOOD PRESSURE: 110 MMHG

## 2024-07-16 DIAGNOSIS — Z80.3 FAMILY HISTORY OF MALIGNANT NEOPLASM OF BREAST: ICD-10-CM

## 2024-07-16 DIAGNOSIS — Z87.09 PERSONAL HISTORY OF OTHER DISEASES OF THE RESPIRATORY SYSTEM: ICD-10-CM

## 2024-07-16 DIAGNOSIS — Z00.00 ENCOUNTER FOR GENERAL ADULT MEDICAL EXAMINATION W/OUT ABNORMAL FINDINGS: ICD-10-CM

## 2024-07-16 DIAGNOSIS — Z80.0 FAMILY HISTORY OF MALIGNANT NEOPLASM OF DIGESTIVE ORGANS: ICD-10-CM

## 2024-07-16 DIAGNOSIS — Z87.19 PERSONAL HISTORY OF OTHER DISEASES OF THE DIGESTIVE SYSTEM: ICD-10-CM

## 2024-07-16 DIAGNOSIS — N92.6 IRREGULAR MENSTRUATION, UNSPECIFIED: ICD-10-CM

## 2024-07-16 PROCEDURE — 99385 PREV VISIT NEW AGE 18-39: CPT

## 2024-07-16 PROCEDURE — 36415 COLL VENOUS BLD VENIPUNCTURE: CPT

## 2024-07-16 PROCEDURE — 99203 OFFICE O/P NEW LOW 30 MIN: CPT | Mod: 25

## 2024-07-16 RX ORDER — ALBUTEROL SULFATE 2.5 MG/.5ML
2.5 SOLUTION RESPIRATORY (INHALATION)
Refills: 0 | Status: ACTIVE | COMMUNITY

## 2024-07-17 LAB
HCG SERPL-MCNC: ABNORMAL MIU/ML
PROGEST SERPL-MCNC: 16.9 NG/ML

## 2024-07-19 LAB
C TRACH RRNA SPEC QL NAA+PROBE: NOT DETECTED
N GONORRHOEA RRNA SPEC QL NAA+PROBE: NOT DETECTED
SOURCE TP AMPLIFICATION: NORMAL

## 2024-07-22 LAB — CYTOLOGY CVX/VAG DOC THIN PREP: NORMAL

## 2024-08-02 ENCOUNTER — APPOINTMENT (OUTPATIENT)
Dept: OBGYN | Facility: CLINIC | Age: 34
End: 2024-08-02
Payer: COMMERCIAL

## 2024-08-02 DIAGNOSIS — O36.80X0 PREGNANCY WITH INCONCLUSIVE FETAL VIABILITY, NOT APPLICABLE OR UNSPECIFIED: ICD-10-CM

## 2024-08-02 PROCEDURE — 99212 OFFICE O/P EST SF 10 MIN: CPT

## 2024-08-02 PROCEDURE — 76817 TRANSVAGINAL US OBSTETRIC: CPT

## 2024-08-02 NOTE — HISTORY OF PRESENT ILLNESS
[FreeTextEntry1] : Patient presented for follow-up for viability sonogram was performed consistent with 7 weeks and 4 days by last menstrual period estimated due date is 3/15/2025 by sonogram estimated due date is 3/17/2025 fetal heart rate is about 159. patient feels well no complaints occasional nausea does not desire Diclegis at this time.  Patient was instructed to follow-up in 2 weeks to start initial OB and sonogram

## 2024-08-05 NOTE — PROCEDURE
[Intrauterine Pregnancy] : intrauterine pregnancy [Yolk Sac] : yolk sac present [Fetal Heart] : fetal heart present [CRL: ___ (mm)] : CRL - [unfilled]Umm [Date: ___] : EDC: [unfilled] [Current GA by Sonogram: ___ (wks)] : Current GA by Sonogram: [unfilled]Uwks [___ day(s)] : [unfilled] days [WNL] : Transvaginal OB Sonogram WNL [FreeTextEntry1] :  As per Dr. Haynes, this ultrasound was performed. A single intrauterine pregnancy is seen. A gestational sac and a yolk sac are visualized. There is a fetal pole with a CRL measuring 7w4d (13.2 mm). MONIQUE should remain 03/15/2025, consistent with LMP. FHR is 159 bpm. Right ovary is normal in size and appearance. Left ovary is normal in size and appearance.

## 2024-08-22 ENCOUNTER — NON-APPOINTMENT (OUTPATIENT)
Age: 34
End: 2024-08-22

## 2024-08-23 ENCOUNTER — APPOINTMENT (OUTPATIENT)
Dept: OBGYN | Facility: CLINIC | Age: 34
End: 2024-08-23
Payer: COMMERCIAL

## 2024-08-23 DIAGNOSIS — Z34.91 ENCOUNTER FOR SUPERVISION OF NORMAL PREGNANCY, UNSPECIFIED, FIRST TRIMESTER: ICD-10-CM

## 2024-08-23 LAB
BILIRUB UR QL STRIP: NORMAL
CLARITY UR: NORMAL
COLLECTION METHOD: NORMAL
GLUCOSE UR-MCNC: NORMAL
HCG UR QL: 0.2 EU/DL
HGB UR QL STRIP.AUTO: NORMAL
KETONES UR-MCNC: NORMAL
LEUKOCYTE ESTERASE UR QL STRIP: NORMAL
NITRITE UR QL STRIP: NORMAL
PH UR STRIP: 5.5
PROT UR STRIP-MCNC: NORMAL
SP GR UR STRIP: 1.02

## 2024-08-23 PROCEDURE — 76817 TRANSVAGINAL US OBSTETRIC: CPT

## 2024-08-23 PROCEDURE — 0500F INITIAL PRENATAL CARE VISIT: CPT

## 2024-08-23 PROCEDURE — 36415 COLL VENOUS BLD VENIPUNCTURE: CPT

## 2024-08-26 LAB
ABO + RH PNL BLD: NORMAL
ALBUMIN SERPL ELPH-MCNC: 4.2 G/DL
ALP BLD-CCNC: 127 U/L
ALT SERPL-CCNC: 13 U/L
ANION GAP SERPL CALC-SCNC: 21 MMOL/L
AST SERPL-CCNC: 16 U/L
BACTERIA UR CULT: NORMAL
BASOPHILS # BLD AUTO: 0.04 K/UL
BASOPHILS NFR BLD AUTO: 0.3 %
BILIRUB SERPL-MCNC: <0.2 MG/DL
BLD GP AB SCN SERPL QL: NORMAL
BUN SERPL-MCNC: 8 MG/DL
CALCIUM SERPL-MCNC: 9.4 MG/DL
CHLORIDE SERPL-SCNC: 99 MMOL/L
CO2 SERPL-SCNC: 19 MMOL/L
CREAT SERPL-MCNC: 0.51 MG/DL
EGFR: 126 ML/MIN/1.73M2
EOSINOPHIL # BLD AUTO: 0.35 K/UL
EOSINOPHIL NFR BLD AUTO: 2.9 %
ESTIMATED AVERAGE GLUCOSE: 100 MG/DL
GLUCOSE SERPL-MCNC: 38 MG/DL
HBA1C MFR BLD HPLC: 5.1 %
HBV SURFACE AG SER QL: NONREACTIVE
HCT VFR BLD CALC: 40.8 %
HCV AB SER QL: NONREACTIVE
HCV S/CO RATIO: 0.14 S/CO
HGB A MFR BLD: 97 %
HGB A2 MFR BLD: 2.5 %
HGB BLD-MCNC: 13.5 G/DL
HGB F MFR BLD: 0.5 %
HGB FRACT BLD-IMP: NORMAL
HIV1+2 AB SPEC QL IA.RAPID: NONREACTIVE
IMM GRANULOCYTES NFR BLD AUTO: 0.3 %
LEAD BLD-MCNC: <1 UG/DL
LYMPHOCYTES # BLD AUTO: 3.17 K/UL
LYMPHOCYTES NFR BLD AUTO: 26.2 %
MAN DIFF?: NORMAL
MCHC RBC-ENTMCNC: 32.5 PG
MCHC RBC-ENTMCNC: 33.1 GM/DL
MCV RBC AUTO: 98.1 FL
MEV IGG FLD QL IA: <5 AU/ML
MEV IGG+IGM SER-IMP: NEGATIVE
MONOCYTES # BLD AUTO: 0.75 K/UL
MONOCYTES NFR BLD AUTO: 6.2 %
MUV AB SER-ACNC: NEGATIVE
MUV IGG SER QL IA: <5 AU/ML
NEUTROPHILS # BLD AUTO: 7.77 K/UL
NEUTROPHILS NFR BLD AUTO: 64.1 %
PLATELET # BLD AUTO: 390 K/UL
POTASSIUM SERPL-SCNC: 3.6 MMOL/L
PROT SERPL-MCNC: 7.7 G/DL
RBC # BLD: 4.16 M/UL
RBC # FLD: 13.5 %
RUBV IGG FLD-ACNC: 5.13 INDEX
RUBV IGG SER-IMP: POSITIVE
SODIUM SERPL-SCNC: 139 MMOL/L
T GONDII AB SER-IMP: NEGATIVE
T GONDII AB SER-IMP: NEGATIVE
T GONDII IGG SER QL: <3 IU/ML
T GONDII IGM SER QL: <3 AU/ML
T PALLIDUM AB SER QL IA: NEGATIVE
TSH SERPL-ACNC: 1.03 UIU/ML
VZV AB TITR SER: POSITIVE
VZV IGG SER IF-ACNC: 340.2 INDEX
WBC # FLD AUTO: 12.12 K/UL

## 2024-08-27 LAB
B19V IGG SER QL IA: 3.19 INDEX
B19V IGG+IGM SER-IMP: NORMAL
B19V IGG+IGM SER-IMP: POSITIVE
B19V IGM FLD-ACNC: 0.2 INDEX
B19V IGM SER-ACNC: NEGATIVE

## 2024-08-28 DIAGNOSIS — O36.80X0 PREGNANCY WITH INCONCLUSIVE FETAL VIABILITY, NOT APPLICABLE OR UNSPECIFIED: ICD-10-CM

## 2024-09-03 NOTE — PROCEDURE
[Intrauterine Pregnancy] : intrauterine pregnancy [Yolk Sac] : yolk sac present [Fetal Heart] : fetal heart present [CRL: ___ (mm)] : CRL - [unfilled]Umm [Date: ___] : EDC: [unfilled] [Current GA by Sonogram: ___ (wks)] : Current GA by Sonogram: [unfilled]Uwks [___ day(s)] : [unfilled] days [FreeTextEntry1] : As per  , this ultrasound was performed. A single intrauterine pregnancy is seen. A gestational sac and a yolk sac are visualized. There is a fetal pole with a CRL measuring 10w6d (39.6 mm). MONIQUE should remain 03/15/2025. FHR is 162 bpm. Right ovary has a normal appearance. Left ovary has a normal appearance. There is an AMMY present measuring: 1.59 x 1.25 x 0.41 cm

## 2024-09-04 ENCOUNTER — APPOINTMENT (OUTPATIENT)
Dept: ANTEPARTUM | Facility: CLINIC | Age: 34
End: 2024-09-04

## 2024-09-09 ENCOUNTER — ASOB RESULT (OUTPATIENT)
Age: 34
End: 2024-09-09

## 2024-09-09 ENCOUNTER — APPOINTMENT (OUTPATIENT)
Dept: ANTEPARTUM | Facility: CLINIC | Age: 34
End: 2024-09-09
Payer: COMMERCIAL

## 2024-09-09 DIAGNOSIS — Z3A.13 13 WEEKS GESTATION OF PREGNANCY: ICD-10-CM

## 2024-09-09 PROCEDURE — 76813 OB US NUCHAL MEAS 1 GEST: CPT

## 2024-09-10 ENCOUNTER — APPOINTMENT (OUTPATIENT)
Dept: OBGYN | Facility: CLINIC | Age: 34
End: 2024-09-10
Payer: COMMERCIAL

## 2024-09-10 ENCOUNTER — NON-APPOINTMENT (OUTPATIENT)
Age: 34
End: 2024-09-10

## 2024-09-10 DIAGNOSIS — N92.6 IRREGULAR MENSTRUATION, UNSPECIFIED: ICD-10-CM

## 2024-09-10 DIAGNOSIS — Z34.91 ENCOUNTER FOR SUPERVISION OF NORMAL PREGNANCY, UNSPECIFIED, FIRST TRIMESTER: ICD-10-CM

## 2024-09-10 DIAGNOSIS — O36.80X0 PREGNANCY WITH INCONCLUSIVE FETAL VIABILITY, NOT APPLICABLE OR UNSPECIFIED: ICD-10-CM

## 2024-09-10 LAB
BILIRUB UR QL STRIP: NORMAL
CLARITY UR: NORMAL
COLLECTION METHOD: NORMAL
GLUCOSE UR-MCNC: NORMAL
HCG UR QL: 0.2 EU/DL
HGB UR QL STRIP.AUTO: NORMAL
KETONES UR-MCNC: NORMAL
LEUKOCYTE ESTERASE UR QL STRIP: NORMAL
NITRITE UR QL STRIP: NORMAL
PH UR STRIP: 5.5
PROT UR STRIP-MCNC: NORMAL
SP GR UR STRIP: 1.01

## 2024-09-10 PROCEDURE — 0502F SUBSEQUENT PRENATAL CARE: CPT

## 2024-09-17 LAB
ADDITIONAL US: NORMAL
COMMENTS: AFP: NORMAL
CRL SCAN TWIN B: NORMAL
CRL SCAN: NORMAL
CROWN RUMP LENGTH TWIN B: NORMAL
CROWN RUMP LENGTH: 70.2 MM
DOWN SYNDROME AGE RISK: NORMAL
DOWN SYNDROME INTERPRETATION: NORMAL
DOWN SYNDROME SCREENING RISK: NORMAL
GEST. AGE ON COLLECTION DATE: 13 WEEKS
HCG MOM: 1.18
HCG VALUE: 95.4 IU/ML
MATERNAL AGE AT EDD: 33.8 YR
NOTE: AFP: NORMAL
NT MOM TWIN B: NORMAL
NT TWIN B: NORMAL
NUCHAL TRANSLUCENCY (NT): 1.7 MM
NUCHAL TRANSLUCENCY MOM: 1.16
NUMBER OF FETUSES: 1
PAPP-A MOM: 0.83
PAPP-A VALUE: 883.1 NG/ML
RACE: NORMAL
RESULTS AFP: NORMAL
SONOGRAPHER ID#: NORMAL
SUBMIT PART 2 SAMPLE USING: NORMAL
TEST RESULTS: AFP: NORMAL
TRISOMY 18 AGE RISK: NORMAL
TRISOMY 18 INTERPRETATION: NORMAL
TRISOMY 18 SCREENING RISK: NORMAL
WEIGHT AFP: 165 LBS

## 2024-09-24 ENCOUNTER — NON-APPOINTMENT (OUTPATIENT)
Age: 34
End: 2024-09-24

## 2024-09-24 ENCOUNTER — APPOINTMENT (OUTPATIENT)
Dept: OBGYN | Facility: CLINIC | Age: 34
End: 2024-09-24
Payer: COMMERCIAL

## 2024-09-24 DIAGNOSIS — Z34.91 ENCOUNTER FOR SUPERVISION OF NORMAL PREGNANCY, UNSPECIFIED, FIRST TRIMESTER: ICD-10-CM

## 2024-09-24 DIAGNOSIS — Z34.92 ENCOUNTER FOR SUPERVISION OF NORMAL PREGNANCY, UNSPECIFIED, SECOND TRIMESTER: ICD-10-CM

## 2024-09-24 LAB
BILIRUB UR QL STRIP: NORMAL
CLARITY UR: CLEAR
COLLECTION METHOD: NORMAL
GLUCOSE UR-MCNC: NORMAL
HCG UR QL: 0.2 EU/DL
HGB UR QL STRIP.AUTO: NORMAL
KETONES UR-MCNC: 15
LEUKOCYTE ESTERASE UR QL STRIP: NORMAL
NITRITE UR QL STRIP: NORMAL
PH UR STRIP: 6
PROT UR STRIP-MCNC: NORMAL
SP GR UR STRIP: 1.02

## 2024-09-24 PROCEDURE — 0502F SUBSEQUENT PRENATAL CARE: CPT

## 2024-09-24 PROCEDURE — 36415 COLL VENOUS BLD VENIPUNCTURE: CPT

## 2024-09-25 LAB
AFP INTERP SERPL-IMP: NORMAL
AFP INTERP SERPL-IMP: NORMAL
AFP MOM CUT-OFF: 2.5
AFP MOM SERPL: 1.21
AFP PERCENTILE: 72
AFP SERPL-ACNC: 31.34 NG/ML
CARBAMAZEPINE?: NO
CURRENT SMOKER: NO
DIABETES STATUS PATIENT: NO
GA: NORMAL
GESTATIONAL AGE METHOD: NORMAL
HX OF NTD NARR: NO
MULTIPLE PREGNANCY: NORMAL
NEURAL TUBE DEFECT RISK FETUS: NORMAL
NEURAL TUBE DEFECT RISK POP: NORMAL
RECOM F/U: NO
TEST PERFORMANCE INFO SPEC: NORMAL
VALPROIC ACID?: NO

## 2024-10-01 ENCOUNTER — APPOINTMENT (OUTPATIENT)
Dept: OBGYN | Facility: CLINIC | Age: 34
End: 2024-10-01